# Patient Record
Sex: FEMALE | URBAN - METROPOLITAN AREA
[De-identification: names, ages, dates, MRNs, and addresses within clinical notes are randomized per-mention and may not be internally consistent; named-entity substitution may affect disease eponyms.]

---

## 2018-01-26 ENCOUNTER — IMPORTED ENCOUNTER (OUTPATIENT)
Dept: URBAN - METROPOLITAN AREA CLINIC 31 | Facility: CLINIC | Age: 83
End: 2018-01-26

## 2018-01-26 PROCEDURE — 99203 OFFICE O/P NEW LOW 30 MIN: CPT

## 2018-01-26 NOTE — PATIENT DISCUSSION
1.  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 Pt had gotten a vaccination in fall 2017. Pt was not aware she needed to be mopre carefule with Vaccinations.   2.  RTN 1 week OC

## 2018-01-29 PROBLEM — T86.840: Noted: 2018-01-29

## 2018-02-02 ENCOUNTER — IMPORTED ENCOUNTER (OUTPATIENT)
Dept: URBAN - METROPOLITAN AREA CLINIC 31 | Facility: CLINIC | Age: 83
End: 2018-02-02

## 2018-02-02 PROCEDURE — 99213 OFFICE O/P EST LOW 20 MIN: CPT

## 2018-02-02 NOTE — PATIENT DISCUSSION
Steroid Induced Glaucoma OS:  Discussed that intraocular pressure is elevated due to steroid medication. Continue present management.

## 2018-02-02 NOTE — PATIENT DISCUSSION
1.  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 Cont with Pred Forte qid and use tears for comfort. Pt had gotten a vaccination in Septl 2017. Gave pt card on rejection. Pt was not aware she needed to be more careful with Vaccinations or increase her Pf then. 2.  Received records and pt started having problems in Oct 2017. Pt was put on steroids then 6x per day and has been tapering but tapered off too soon. 3.  Glaucoma --  Pt taking a japanese drop called Glanatec bid. Pt will cont with 2x per day. 4.  Fuchs Dystrophy OD---5.   RTN 3 week OC

## 2018-02-05 PROBLEM — H40.042: Noted: 2018-02-05

## 2018-02-05 PROBLEM — T86.840: Noted: 2018-02-02

## 2018-02-05 PROBLEM — H40.033: Noted: 2018-02-05

## 2018-02-05 PROBLEM — Z96.1: Noted: 2018-02-05

## 2018-02-23 ENCOUNTER — IMPORTED ENCOUNTER (OUTPATIENT)
Dept: URBAN - METROPOLITAN AREA CLINIC 31 | Facility: CLINIC | Age: 83
End: 2018-02-23

## 2018-02-23 PROBLEM — T86.840: Noted: 2018-02-23

## 2018-02-23 PROCEDURE — 99213 OFFICE O/P EST LOW 20 MIN: CPT

## 2018-02-23 NOTE — PATIENT DISCUSSION
1.  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 Cont with Pred Forte tid and use tears for comfort. Pt had gotten a vaccination in Septl 2017. Gave pt card on rejection. Pt was not aware she needed to be more careful with Vaccinations or increase her Pf then. 2.  Received records and pt started having problems in Oct 2017. Pt was put on steroids then 6x per day and has been tapering but tapered off too soon. 3.  Steroid Induced Glaucoma OS--  Pt taking a japanese drop called Glanatec bid. Pt will cont with 2x per day. 4.  Fuchs Dystrophy OD---5. Pseudophakia OU-  stable6.   RTN 1 mth OC

## 2018-03-23 ENCOUNTER — IMPORTED ENCOUNTER (OUTPATIENT)
Dept: URBAN - METROPOLITAN AREA CLINIC 31 | Facility: CLINIC | Age: 83
End: 2018-03-23

## 2018-03-23 PROBLEM — T86.840: Noted: 2018-03-23

## 2018-03-23 PROBLEM — Z96.1: Noted: 2018-03-23

## 2018-03-23 PROCEDURE — 99213 OFFICE O/P EST LOW 20 MIN: CPT

## 2018-03-23 NOTE — PATIENT DISCUSSION
1.  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 Cont with Pred Forte tid and use tears for comfort. Pt had gotten a vaccination in Septl 2017. Gave pt card on rejection. Pt was not aware she needed to be more careful with Vaccinations or increase her Pf then. 2.  Received records and pt started having problems in Oct 2017. Pt was put on steroids then 6x per day and has been tapering but tapered off too soon. 3.  Steroid Induced Glaucoma OS--  Pt taking a japanese drop called Glanatec bid. Pt will cont with 2x per day. 4.  Fuchs Dystrophy OD---5. Pseudophakia OU-  stable6. RTN 1 mth OC with DR Moreno. Pt leaving to go up [place] in June 2018.

## 2018-05-03 ENCOUNTER — IMPORTED ENCOUNTER (OUTPATIENT)
Dept: URBAN - METROPOLITAN AREA CLINIC 31 | Facility: CLINIC | Age: 83
End: 2018-05-03

## 2018-05-03 PROBLEM — T86.840: Noted: 2018-05-03

## 2018-05-03 PROBLEM — H18.51: Noted: 2018-05-03

## 2018-05-03 PROBLEM — Z96.1: Noted: 2018-05-03

## 2018-05-03 PROBLEM — H25.811: Noted: 2018-05-03

## 2018-05-03 PROCEDURE — 99213 OFFICE O/P EST LOW 20 MIN: CPT

## 2018-05-03 NOTE — PATIENT DISCUSSION
1.  Graft Rejection:  No active rejection at this time. Discussed topical steroids to treat rejection. PRED 4x/dAdd NaCl drops and annita to see if graft will clear. 2. Pseudophakia OS - IOL stable. Monitor. 3. Fuchs Dystrophy OD: Recommend clinical observation. Advised use of Brad 128 drops in affected eye if worsening. 4. Combined Types of Cataract OD: Explained how cataracts can effect vision. Recommend clinical observation. The patient was advised to contact us if any change or worsening of vision. 5. Return for an appointment in 3 weeks for office call. with Dr. Alonzo Bragg.

## 2018-05-03 NOTE — PATIENT DISCUSSION
Fuchs Dystrophy OD: Recommend clinical observation. Advised use of Brad 128 drops in affected eye if worsening.

## 2018-05-31 ENCOUNTER — IMPORTED ENCOUNTER (OUTPATIENT)
Dept: URBAN - METROPOLITAN AREA CLINIC 31 | Facility: CLINIC | Age: 83
End: 2018-05-31

## 2018-05-31 PROBLEM — H25.811: Noted: 2018-05-31

## 2018-05-31 PROBLEM — Z96.1: Noted: 2018-05-31

## 2018-05-31 PROBLEM — H18.51: Noted: 2018-05-31

## 2018-05-31 PROBLEM — T86.840: Noted: 2018-05-31

## 2018-05-31 PROCEDURE — 92012 INTRM OPH EXAM EST PATIENT: CPT

## 2018-05-31 NOTE — PATIENT DISCUSSION
1.  Graft Rejection:  Edema improving on drops  Discussed topical steroids to treat rejection. PRED 4x/dNaCl drops and annita f/u Dr Daniel Arriaza. Will have ECC when she is back home (not available in our satellite office) to see if graft will clear further. 2. Pseudophakia OS - IOL stable. Monitor. 3. Fuchs Dystrophy OD: Recommend clinical observation. Advised use of Brad 128 drops in affected eye if worsening. 4. Combined Types of Cataract OD: Explained how cataracts can effect vision. Recommend clinical observation. The patient was advised to contact us if any change or worsening of vision. Return for an appointment for office call. with Dr. Mega Urban.

## 2020-02-20 ENCOUNTER — IMPORTED ENCOUNTER (OUTPATIENT)
Dept: URBAN - METROPOLITAN AREA CLINIC 31 | Facility: CLINIC | Age: 85
End: 2020-02-20

## 2020-02-20 PROBLEM — Z94.7: Noted: 2020-02-20

## 2020-02-20 PROBLEM — Z96.1: Noted: 2020-02-20

## 2020-02-20 PROCEDURE — 99213 OFFICE O/P EST LOW 20 MIN: CPT

## 2020-02-20 NOTE — PATIENT DISCUSSION
1.  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 Continue topical steroids as directed. Rejection and vaccinations reviewed. Card givenPatient sees Dr. Jasmyn Crisostomo in [facility] upon [facility]. Pseudophakia OU - IOLs stable. Monitorfor changes in vision. 3. Return for an appointment in 12 months for office call. with Dr. Estefanía Lai.

## 2021-02-25 ENCOUNTER — IMPORTED ENCOUNTER (OUTPATIENT)
Dept: URBAN - METROPOLITAN AREA CLINIC 31 | Facility: CLINIC | Age: 86
End: 2021-02-25

## 2021-02-25 PROBLEM — Z96.1: Noted: 2021-02-25

## 2021-02-25 PROBLEM — Z94.7: Noted: 2021-02-25

## 2021-02-25 PROCEDURE — 99213 OFFICE O/P EST LOW 20 MIN: CPT

## 2021-02-25 NOTE — PATIENT DISCUSSION
1.  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 Continue topical steroids as directed. Rejection and vaccinations reviewed. Card given Dr Efrain Wiseman retiring according to patient2. Pseudophakia OU - IOLs stable. Monitorfor changes in vision. 3. Return for an appointment in 12 months for office call. with Dr. Alberto Pandey.

## 2022-04-02 ASSESSMENT — VISUAL ACUITY
OS_PH: SC 20/60 +2
OS_CC: 20/200
OS_CC: 20/200
OD_CC: 20/50+2
OS_CC: 20/100-2
OD_CC: 20/60-2
OU_SC: 20/2016''
OS_CC: 20/25-2
OS_SC: 20/200
OD_SC: 20/20
OS_CC: 20/200
OD_CC: 20/60-1
OS_CC: 20/30
OS_PH: SC 20/70 -2
OS_SC: 20/30
OS_CC: 20/100
OD_CC: 20/60-2
OD_CC: 20/40-2
OD_SC: 20/50+2
OD_PH: SC 20/30 -1
OS_PH: SC 20/80 -1
OS_PH: SC 20/100
OD_PH: CC 20/40 +1
OD_CC: 20/50-1
OS_CC: 20/100-1
OD_CC: 20/40
OD_CC: 20/50-1
OD_PH: SC 20/25

## 2022-04-02 ASSESSMENT — TONOMETRY
OS_IOP_MMHG: 12
OD_IOP_MMHG: 16
OS_IOP_MMHG: 13
OS_IOP_MMHG: 16
OD_IOP_MMHG: 11
OS_IOP_MMHG: 15
OD_IOP_MMHG: 12
OD_IOP_MMHG: 11
OS_IOP_MMHG: 14
OD_IOP_MMHG: 16
OD_IOP_MMHG: 16
OD_IOP_MMHG: 10
OS_IOP_MMHG: 14
OS_IOP_MMHG: 12